# Patient Record
Sex: MALE | Race: WHITE | NOT HISPANIC OR LATINO | Employment: FULL TIME | URBAN - METROPOLITAN AREA
[De-identification: names, ages, dates, MRNs, and addresses within clinical notes are randomized per-mention and may not be internally consistent; named-entity substitution may affect disease eponyms.]

---

## 2018-05-22 ENCOUNTER — APPOINTMENT (OUTPATIENT)
Dept: URGENT CARE | Facility: MEDICAL CENTER | Age: 33
End: 2018-05-22
Payer: OTHER MISCELLANEOUS

## 2018-05-22 PROCEDURE — G0382 LEV 3 HOSP TYPE B ED VISIT: HCPCS

## 2018-05-22 PROCEDURE — 90715 TDAP VACCINE 7 YRS/> IM: CPT

## 2018-05-22 PROCEDURE — 99283 EMERGENCY DEPT VISIT LOW MDM: CPT

## 2018-05-24 ENCOUNTER — APPOINTMENT (OUTPATIENT)
Dept: URGENT CARE | Facility: MEDICAL CENTER | Age: 33
End: 2018-05-24
Payer: OTHER MISCELLANEOUS

## 2018-05-24 PROCEDURE — 99213 OFFICE O/P EST LOW 20 MIN: CPT | Performed by: PHYSICIAN ASSISTANT

## 2024-01-14 ENCOUNTER — HOSPITAL ENCOUNTER (EMERGENCY)
Facility: HOSPITAL | Age: 39
Discharge: HOME | End: 2024-01-14
Attending: EMERGENCY MEDICINE | Admitting: EMERGENCY MEDICINE
Payer: COMMERCIAL

## 2024-01-14 ENCOUNTER — APPOINTMENT (EMERGENCY)
Dept: CT IMAGING | Facility: HOSPITAL | Age: 39
End: 2024-01-14
Payer: COMMERCIAL

## 2024-01-14 ENCOUNTER — APPOINTMENT (EMERGENCY)
Dept: RADIOLOGY | Facility: HOSPITAL | Age: 39
End: 2024-01-14
Payer: COMMERCIAL

## 2024-01-14 VITALS
OXYGEN SATURATION: 97 % | DIASTOLIC BLOOD PRESSURE: 82 MMHG | SYSTOLIC BLOOD PRESSURE: 139 MMHG | TEMPERATURE: 98 F | BODY MASS INDEX: 26.71 KG/M2 | HEIGHT: 66 IN | WEIGHT: 166.23 LBS | HEART RATE: 88 BPM | RESPIRATION RATE: 18 BRPM

## 2024-01-14 DIAGNOSIS — S82.142A TIBIAL PLATEAU FRACTURE, LEFT: ICD-10-CM
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
## 2024-01-14 PROCEDURE — G1004 CDSM NDSC: HCPCS

## 2024-01-14 PROCEDURE — NC001 PR NO CHARGE: Performed by: EMERGENCY MEDICINE

## 2024-01-14 PROCEDURE — 71111 X-RAY EXAM RIBS/CHEST4/> VWS: CPT

## 2024-01-14 PROCEDURE — 73564 X-RAY EXAM KNEE 4 OR MORE: CPT

## 2024-01-14 PROCEDURE — 73700 CT LOWER EXTREMITY W/O DYE: CPT

## 2024-01-14 PROCEDURE — 99284 EMERGENCY DEPT VISIT MOD MDM: CPT

## 2024-01-14 RX ORDER — OXYCODONE HYDROCHLORIDE AND ACETAMINOPHEN 5; 325 MG/1; MG/1
1 TABLET ORAL EVERY 4 HOURS PRN
Qty: 12 TABLET | Refills: 0 | Status: SHIPPED | OUTPATIENT
Start: 2024-01-14 | End: 2024-01-16

## 2024-01-14 RX ORDER — OXYCODONE HYDROCHLORIDE AND ACETAMINOPHEN 5; 325 MG/1; MG/1
1 TABLET ORAL ONCE
Status: COMPLETED | OUTPATIENT
Start: 2024-01-14 | End: 2024-01-14

## 2024-01-14 RX ORDER — IBUPROFEN 400 MG/1
800 TABLET ORAL ONCE
Status: COMPLETED | OUTPATIENT
Start: 2024-01-14 | End: 2024-01-14

## 2024-01-14 RX ADMIN — IBUPROFEN 800 MG: 400 TABLET, FILM COATED ORAL at 18:40

## 2024-01-14 RX ADMIN — OXYCODONE HYDROCHLORIDE AND ACETAMINOPHEN 1 TABLET: 5; 325 TABLET ORAL at 21:40

## 2024-01-14 NOTE — Clinical Note
García Briscoe was seen and treated in our emergency department on 1/14/2024.        No work until cleared by Family Doctor/Orthopedics        Diagnosis:     García  .    He may return on this date:          If you have any questions or concerns, please don't hesitate to call.      She Posadas PA-C    ______________________________           _______________          _______________  Hospital Representative                              Date                                Time
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
## 2024-01-16 ENCOUNTER — TELEPHONE (OUTPATIENT)
Age: 39
End: 2024-01-16

## 2024-01-16 RX ORDER — OXYCODONE HYDROCHLORIDE AND ACETAMINOPHEN 5; 325 MG/1; MG/1
1 TABLET ORAL EVERY 4 HOURS PRN
Qty: 10 TABLET | Refills: 0 | Status: SHIPPED | OUTPATIENT
Start: 2024-01-16 | End: 2024-01-23

## 2024-01-16 NOTE — ED PROVIDER NOTES
1/16/2024  0958 - Patient unable to fill Percocet prescription at [facility] because they currently do not have power. Patient requesting we send the medication to [facility] in [place]. I attempted to call [facility] pharmacy to inform them we were discontinuing the Percocet prescription. I was unable to get in touch with the pharmacy staff but prescription was discontinued through [facility]. New prescription sent to [facility] in [place].      Stephania Dallas PA-C  01/16/24 1007

## 2024-01-16 NOTE — TELEPHONE ENCOUNTER
Caller: Patient     Doctor: Jennifer      Reason for call: Patient is a new patient with a scheduled appt on 1/18 with a Tibial plateau fracture he states he had a prescription sent over to [facility] but they have been closed and patient would like to know if prescription can be sent over to a different pharmacy   Please advise     Call back#: [phone]

## 2024-01-18 ENCOUNTER — TELEPHONE (OUTPATIENT)
Age: 39
End: 2024-01-18

## 2024-01-18 ENCOUNTER — LAB REQUISITION (OUTPATIENT)
Dept: LAB | Facility: HOSPITAL | Age: 39
End: 2024-01-18

## 2024-01-18 ENCOUNTER — APPOINTMENT (OUTPATIENT)
Dept: LAB | Facility: AMBULARY SURGERY CENTER | Age: 39
End: 2024-01-18
Payer: COMMERCIAL

## 2024-01-18 VITALS
DIASTOLIC BLOOD PRESSURE: 76 MMHG | BODY MASS INDEX: 26.71 KG/M2 | SYSTOLIC BLOOD PRESSURE: 113 MMHG | HEART RATE: 68 BPM | HEIGHT: 66 IN | WEIGHT: 166.22 LBS

## 2024-01-18 DIAGNOSIS — S82.142A DISPLACED BICONDYLAR FRACTURE OF LEFT TIBIA, INITIAL ENCOUNTER FOR CLOSED FRACTURE: ICD-10-CM

## 2024-01-18 DIAGNOSIS — S82.142A CLOSED BICONDYLAR FRACTURE OF LEFT TIBIAL PLATEAU: Primary | ICD-10-CM

## 2024-01-18 DIAGNOSIS — S82.142A CLOSED BICONDYLAR FRACTURE OF LEFT TIBIAL PLATEAU: ICD-10-CM

## 2024-01-18 LAB
ABO GROUP BLD: NORMAL
ANION GAP SERPL CALCULATED.3IONS-SCNC: 8 MMOL/L
BLD GP AB SCN SERPL QL: NEGATIVE
BUN SERPL-MCNC: 12 MG/DL (ref 5–25)
CALCIUM SERPL-MCNC: 9.8 MG/DL (ref 8.4–10.2)
CHLORIDE SERPL-SCNC: 103 MMOL/L (ref 96–108)
CO2 SERPL-SCNC: 28 MMOL/L (ref 21–32)
CREAT SERPL-MCNC: 0.9 MG/DL (ref 0.6–1.3)
ERYTHROCYTE [DISTWIDTH] IN BLOOD BY AUTOMATED COUNT: 12.1 % (ref 11.6–15.1)
GFR SERPL CREATININE-BSD FRML MDRD: 107 ML/MIN/1.73SQ M
GLUCOSE SERPL-MCNC: 96 MG/DL (ref 65–140)
HCT VFR BLD AUTO: 43.2 % (ref 36.5–49.3)
HGB BLD-MCNC: 14.4 G/DL (ref 12–17)
MCH RBC QN AUTO: 30.8 PG (ref 26.8–34.3)
MCHC RBC AUTO-ENTMCNC: 33.3 G/DL (ref 31.4–37.4)
MCV RBC AUTO: 92 FL (ref 82–98)
PLATELET # BLD AUTO: 175 THOUSANDS/UL (ref 149–390)
PMV BLD AUTO: 11.4 FL (ref 8.9–12.7)
POTASSIUM SERPL-SCNC: 4.3 MMOL/L (ref 3.5–5.3)
RBC # BLD AUTO: 4.68 MILLION/UL (ref 3.88–5.62)
RH BLD: NEGATIVE
SODIUM SERPL-SCNC: 139 MMOL/L (ref 135–147)
SPECIMEN EXPIRATION DATE: NORMAL
WBC # BLD AUTO: 5.42 THOUSAND/UL (ref 4.31–10.16)

## 2024-01-18 PROCEDURE — 86900 BLOOD TYPING SEROLOGIC ABO: CPT | Performed by: STUDENT IN AN ORGANIZED HEALTH CARE EDUCATION/TRAINING PROGRAM

## 2024-01-18 PROCEDURE — 80048 BASIC METABOLIC PNL TOTAL CA: CPT

## 2024-01-18 PROCEDURE — 36415 COLL VENOUS BLD VENIPUNCTURE: CPT

## 2024-01-18 PROCEDURE — 85027 COMPLETE CBC AUTOMATED: CPT

## 2024-01-18 PROCEDURE — 99204 OFFICE O/P NEW MOD 45 MIN: CPT | Performed by: STUDENT IN AN ORGANIZED HEALTH CARE EDUCATION/TRAINING PROGRAM

## 2024-01-18 PROCEDURE — 86850 RBC ANTIBODY SCREEN: CPT | Performed by: STUDENT IN AN ORGANIZED HEALTH CARE EDUCATION/TRAINING PROGRAM

## 2024-01-18 PROCEDURE — 86901 BLOOD TYPING SEROLOGIC RH(D): CPT | Performed by: STUDENT IN AN ORGANIZED HEALTH CARE EDUCATION/TRAINING PROGRAM

## 2024-01-18 RX ORDER — ASPIRIN 81 MG/1
81 TABLET, CHEWABLE ORAL 2 TIMES DAILY
Status: CANCELLED | OUTPATIENT
Start: 2024-01-18 | End: 2024-01-25

## 2024-01-18 RX ORDER — OXYCODONE HYDROCHLORIDE 5 MG/1
5 TABLET ORAL EVERY 4 HOURS PRN
Qty: 30 TABLET | Refills: 0 | Status: SHIPPED | OUTPATIENT
Start: 2024-01-18 | End: 2024-01-23

## 2024-01-18 RX ORDER — ASPIRIN 81 MG/1
81 TABLET, CHEWABLE ORAL 2 TIMES DAILY
Qty: 100 TABLET | Refills: 0 | Status: SHIPPED | OUTPATIENT
Start: 2024-01-18 | End: 2024-01-23

## 2024-01-18 RX ORDER — CHLORHEXIDINE GLUCONATE ORAL RINSE 1.2 MG/ML
15 SOLUTION DENTAL ONCE
Status: CANCELLED | OUTPATIENT
Start: 2024-01-18 | End: 2024-01-18

## 2024-01-18 RX ORDER — CHLORHEXIDINE GLUCONATE 4 G/100ML
SOLUTION TOPICAL DAILY PRN
Status: CANCELLED | OUTPATIENT
Start: 2024-01-18
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
## 2024-01-22 ENCOUNTER — ANESTHESIA EVENT (OUTPATIENT)
Dept: PERIOP | Facility: HOSPITAL | Age: 39
DRG: 494 | End: 2024-01-22
Payer: COMMERCIAL

## 2024-01-22 NOTE — PRE-PROCEDURE INSTRUCTIONS
Pre-Surgery Instructions:   Medication Instructions    aspirin 81 mg chewable tablet PER MD    oxyCODONE (Roxicodone) 5 immediate release tablet Uses PRN- OK to take day of surgery   Medication instructions for day surgery reviewed. Please use only a sip of water to take your instructed medications. Avoid all over the counter vitamins, supplements and NSAIDS for one week prior to surgery per anesthesia guidelines. Tylenol is ok to take as needed.     You will receive a call one business day prior to surgery with an arrival time and hospital directions. If your surgery is scheduled on a Monday, the hospital will be calling you on the Friday prior to your surgery. If you have not heard from anyone by 8pm, please call the hospital supervisor through the hospital  at [phone].     Do not eat or drink anything after midnight the night before your surgery, including candy, mints, lifesavers, or chewing gum. Do not drink alcohol 24hrs before your surgery. Try not to smoke at least 24hrs before your surgery.       Follow the pre surgery showering instructions as listed in the “My Surgical Experience Booklet” or otherwise provided by your surgeon's office. Do not use a blade to shave the surgical area 1 week before surgery. It is okay to use a clean electric clippers up to 24 hours before surgery. Do not apply any lotions, creams, including makeup, cologne, deodorant, or perfumes after showering on the day of your surgery. Do not use dry shampoo, hair spray, hair gel, or any type of hair products.     No contact lenses, eye make-up, or artificial eyelashes. Remove nail polish, including gel polish, and any artificial, gel, or acrylic nails if possible. Remove all jewelry including rings and body piercing jewelry.     Wear causal clothing that is easy to take on and off. Consider your type of surgery.    Keep any valuables, jewelry, piercings at home. Please bring any specially ordered equipment (sling, braces) if  indicated.    Arrange for a responsible person to drive you to and from the hospital on the day of your surgery. Visitor Guidelines discussed.     Call the surgeon's office with any new illnesses, exposures, or additional questions prior to surgery.    Please reference your “My Surgical Experience Booklet” for additional information to prepare for your upcoming surgery.

## 2024-01-22 NOTE — ANESTHESIA PREPROCEDURE EVALUATION
Procedure:  OPEN REDUCTION W/ INTERNAL FIXATION (ORIF) TIBIAL PLATEAU (Left: Knee)    Relevant Problems   No relevant active problems   Asthma  Former smoker     Physical Exam    Airway    Mallampati score: II  TM Distance: >3 FB  Neck ROM: full     Dental   No notable dental hx     Cardiovascular  Cardiovascular exam normal    Pulmonary  Pulmonary exam normal     Other Findings        Anesthesia Plan  ASA Score- 2     Anesthesia Type- general with ASA Monitors.         Additional Monitors:     Airway Plan: ETT.    Comment: + PNB.       Plan Factors-    Chart reviewed.   Existing labs reviewed. Patient summary reviewed.                  Induction- intravenous.    Postoperative Plan-     Informed Consent- Anesthetic plan and risks discussed with patient.  I personally reviewed this patient with the CRNA. Discussed and agreed on the Anesthesia Plan with the CRNA..

## 2024-01-23 ENCOUNTER — HOSPITAL ENCOUNTER (INPATIENT)
Facility: HOSPITAL | Age: 39
LOS: 1 days | Discharge: HOME | DRG: 494 | End: 2024-01-23
Attending: STUDENT IN AN ORGANIZED HEALTH CARE EDUCATION/TRAINING PROGRAM | Admitting: STUDENT IN AN ORGANIZED HEALTH CARE EDUCATION/TRAINING PROGRAM
Payer: COMMERCIAL

## 2024-01-23 ENCOUNTER — APPOINTMENT (OUTPATIENT)
Dept: RADIOLOGY | Facility: HOSPITAL | Age: 39
DRG: 494 | End: 2024-01-23
Payer: COMMERCIAL

## 2024-01-23 ENCOUNTER — ANESTHESIA (OUTPATIENT)
Dept: PERIOP | Facility: HOSPITAL | Age: 39
DRG: 494 | End: 2024-01-23
Payer: COMMERCIAL

## 2024-01-23 VITALS
BODY MASS INDEX: 26.68 KG/M2 | SYSTOLIC BLOOD PRESSURE: 122 MMHG | DIASTOLIC BLOOD PRESSURE: 61 MMHG | OXYGEN SATURATION: 98 % | WEIGHT: 166 LBS | HEIGHT: 66 IN | HEART RATE: 80 BPM | TEMPERATURE: 97.8 F | RESPIRATION RATE: 20 BRPM

## 2024-01-23 DIAGNOSIS — S82.142A CLOSED FRACTURE OF LEFT TIBIAL PLATEAU, INITIAL ENCOUNTER: Primary | ICD-10-CM

## 2024-01-23 PROCEDURE — C1713 ANCHOR/SCREW BN/BN,TIS/BN: HCPCS | Performed by: STUDENT IN AN ORGANIZED HEALTH CARE EDUCATION/TRAINING PROGRAM

## 2024-01-23 PROCEDURE — 73560 X-RAY EXAM OF KNEE 1 OR 2: CPT

## 2024-01-23 PROCEDURE — 27536 TREAT KNEE FRACTURE: CPT | Performed by: STUDENT IN AN ORGANIZED HEALTH CARE EDUCATION/TRAINING PROGRAM

## 2024-01-23 PROCEDURE — 0QSH04Z REPOSITION LEFT TIBIA WITH INTERNAL FIXATION DEVICE, OPEN APPROACH: ICD-10-PCS | Performed by: STUDENT IN AN ORGANIZED HEALTH CARE EDUCATION/TRAINING PROGRAM

## 2024-01-23 DEVICE — 3.5MM CORTEX SCREW SELF-TAPPING 38MM: Type: IMPLANTABLE DEVICE | Site: TIBIA | Status: FUNCTIONAL

## 2024-01-23 DEVICE — 3.5MM CORTEX SCREW SELF-TAPPING 34MM: Type: IMPLANTABLE DEVICE | Site: TIBIA | Status: FUNCTIONAL

## 2024-01-23 DEVICE — IMPLANTABLE DEVICE: Type: IMPLANTABLE DEVICE | Site: KNEE | Status: FUNCTIONAL

## 2024-01-23 DEVICE — 3.5MM VARIABLE ANGLE LOCKING SCREW/SLF-TPNG/STRDRV/75MM: Type: IMPLANTABLE DEVICE | Site: TIBIA | Status: FUNCTIONAL

## 2024-01-23 DEVICE — 3.5MM VA-LCP PROX TIBIA PLATE LARGE BEND/4H/87MM/LEFT
Type: IMPLANTABLE DEVICE | Site: TIBIA | Status: FUNCTIONAL
Brand: VA-LCP

## 2024-01-23 RX ORDER — MIDAZOLAM HYDROCHLORIDE 2 MG/2ML
INJECTION, SOLUTION INTRAMUSCULAR; INTRAVENOUS AS NEEDED
Status: DISCONTINUED | OUTPATIENT
Start: 2024-01-23 | End: 2024-01-23

## 2024-01-23 RX ORDER — ROCURONIUM BROMIDE 10 MG/ML
INJECTION, SOLUTION INTRAVENOUS AS NEEDED
Status: DISCONTINUED | OUTPATIENT
Start: 2024-01-23 | End: 2024-01-23

## 2024-01-23 RX ORDER — CHLORHEXIDINE GLUCONATE ORAL RINSE 1.2 MG/ML
15 SOLUTION DENTAL ONCE
Status: COMPLETED | OUTPATIENT
Start: 2024-01-23 | End: 2024-01-23

## 2024-01-23 RX ORDER — HYDROMORPHONE HCL/PF 1 MG/ML
0.5 SYRINGE (ML) INJECTION
Status: DISCONTINUED | OUTPATIENT
Start: 2024-01-23 | End: 2024-01-23 | Stop reason: HOSPADM

## 2024-01-23 RX ORDER — GLYCOPYRROLATE 0.2 MG/ML
INJECTION INTRAMUSCULAR; INTRAVENOUS AS NEEDED
Status: DISCONTINUED | OUTPATIENT
Start: 2024-01-23 | End: 2024-01-23

## 2024-01-23 RX ORDER — CEPHALEXIN 500 MG/1
500 CAPSULE ORAL EVERY 12 HOURS SCHEDULED
Qty: 10 CAPSULE | Refills: 0 | Status: SHIPPED | OUTPATIENT
Start: 2024-01-23 | End: 2024-01-28

## 2024-01-23 RX ORDER — CHLORHEXIDINE GLUCONATE 4 G/100ML
SOLUTION TOPICAL DAILY PRN
Status: DISCONTINUED | OUTPATIENT
Start: 2024-01-23 | End: 2024-01-23 | Stop reason: HOSPADM

## 2024-01-23 RX ORDER — CEFAZOLIN SODIUM 2 G/50ML
2000 SOLUTION INTRAVENOUS ONCE
Status: COMPLETED | OUTPATIENT
Start: 2024-01-23 | End: 2024-01-23

## 2024-01-23 RX ORDER — PROPOFOL 10 MG/ML
INJECTION, EMULSION INTRAVENOUS AS NEEDED
Status: DISCONTINUED | OUTPATIENT
Start: 2024-01-23 | End: 2024-01-23

## 2024-01-23 RX ORDER — ASPIRIN 325 MG
325 TABLET ORAL 2 TIMES DAILY
Qty: 84 TABLET | Refills: 0 | Status: SHIPPED | OUTPATIENT
Start: 2024-01-23 | End: 2024-03-05

## 2024-01-23 RX ORDER — ALBUTEROL SULFATE 90 UG/1
2 AEROSOL, METERED RESPIRATORY (INHALATION) EVERY 6 HOURS PRN
Status: DISCONTINUED | OUTPATIENT
Start: 2024-01-23 | End: 2024-01-23 | Stop reason: HOSPADM

## 2024-01-23 RX ORDER — MAGNESIUM HYDROXIDE 1200 MG/15ML
LIQUID ORAL AS NEEDED
Status: DISCONTINUED | OUTPATIENT
Start: 2024-01-23 | End: 2024-01-23 | Stop reason: HOSPADM

## 2024-01-23 RX ORDER — NEOSTIGMINE METHYLSULFATE 1 MG/ML
INJECTION INTRAVENOUS AS NEEDED
Status: DISCONTINUED | OUTPATIENT
Start: 2024-01-23 | End: 2024-01-23

## 2024-01-23 RX ORDER — VANCOMYCIN HYDROCHLORIDE 1 G/20ML
INJECTION, POWDER, LYOPHILIZED, FOR SOLUTION INTRAVENOUS AS NEEDED
Status: DISCONTINUED | OUTPATIENT
Start: 2024-01-23 | End: 2024-01-23 | Stop reason: HOSPADM

## 2024-01-23 RX ORDER — LIDOCAINE HYDROCHLORIDE 20 MG/ML
INJECTION, SOLUTION EPIDURAL; INFILTRATION; INTRACAUDAL; PERINEURAL AS NEEDED
Status: DISCONTINUED | OUTPATIENT
Start: 2024-01-23 | End: 2024-01-23

## 2024-01-23 RX ORDER — FENTANYL CITRATE 50 UG/ML
INJECTION, SOLUTION INTRAMUSCULAR; INTRAVENOUS AS NEEDED
Status: DISCONTINUED | OUTPATIENT
Start: 2024-01-23 | End: 2024-01-23

## 2024-01-23 RX ORDER — DEXAMETHASONE SODIUM PHOSPHATE 10 MG/ML
INJECTION, SOLUTION INTRAMUSCULAR; INTRAVENOUS AS NEEDED
Status: DISCONTINUED | OUTPATIENT
Start: 2024-01-23 | End: 2024-01-23

## 2024-01-23 RX ORDER — SODIUM CHLORIDE, SODIUM LACTATE, POTASSIUM CHLORIDE, CALCIUM CHLORIDE 600; 310; 30; 20 MG/100ML; MG/100ML; MG/100ML; MG/100ML
INJECTION, SOLUTION INTRAVENOUS CONTINUOUS PRN
Status: DISCONTINUED | OUTPATIENT
Start: 2024-01-23 | End: 2024-01-23

## 2024-01-23 RX ORDER — ONDANSETRON 2 MG/ML
INJECTION INTRAMUSCULAR; INTRAVENOUS AS NEEDED
Status: DISCONTINUED | OUTPATIENT
Start: 2024-01-23 | End: 2024-01-23

## 2024-01-23 RX ORDER — ONDANSETRON 2 MG/ML
4 INJECTION INTRAMUSCULAR; INTRAVENOUS ONCE AS NEEDED
Status: DISCONTINUED | OUTPATIENT
Start: 2024-01-23 | End: 2024-01-23 | Stop reason: HOSPADM

## 2024-01-23 RX ORDER — LIDOCAINE HYDROCHLORIDE 10 MG/ML
INJECTION, SOLUTION EPIDURAL; INFILTRATION; INTRACAUDAL; PERINEURAL AS NEEDED
Status: DISCONTINUED | OUTPATIENT
Start: 2024-01-23 | End: 2024-01-23

## 2024-01-23 RX ORDER — FENTANYL CITRATE/PF 50 MCG/ML
25 SYRINGE (ML) INJECTION
Status: DISCONTINUED | OUTPATIENT
Start: 2024-01-23 | End: 2024-01-23 | Stop reason: HOSPADM

## 2024-01-23 RX ORDER — OXYCODONE HYDROCHLORIDE 5 MG/1
5 TABLET ORAL EVERY 4 HOURS PRN
Status: DISCONTINUED | OUTPATIENT
Start: 2024-01-23 | End: 2024-01-23 | Stop reason: HOSPADM

## 2024-01-23 RX ORDER — OXYCODONE HYDROCHLORIDE 5 MG/1
5 TABLET ORAL EVERY 4 HOURS PRN
Qty: 30 TABLET | Refills: 0 | Status: SHIPPED | OUTPATIENT
Start: 2024-01-23 | End: 2024-02-02

## 2024-01-23 RX ORDER — ALBUTEROL SULFATE 90 UG/1
2 AEROSOL, METERED RESPIRATORY (INHALATION) EVERY 6 HOURS PRN
COMMUNITY

## 2024-01-23 RX ORDER — EPHEDRINE SULFATE 50 MG/ML
INJECTION INTRAVENOUS AS NEEDED
Status: DISCONTINUED | OUTPATIENT
Start: 2024-01-23 | End: 2024-01-23

## 2024-01-23 RX ORDER — HYDROMORPHONE HCL/PF 1 MG/ML
SYRINGE (ML) INJECTION AS NEEDED
Status: DISCONTINUED | OUTPATIENT
Start: 2024-01-23 | End: 2024-01-23

## 2024-01-23 RX ADMIN — NEOSTIGMINE METHYLSULFATE 3 MG: 0.5 INJECTION INTRAVENOUS at 09:25

## 2024-01-23 RX ADMIN — CEFAZOLIN SODIUM 2000 MG: 2 SOLUTION INTRAVENOUS at 07:35

## 2024-01-23 RX ADMIN — GLYCOPYRROLATE 0.6 MCG: 0.2 INJECTION INTRAMUSCULAR; INTRAVENOUS at 09:25

## 2024-01-23 RX ADMIN — FENTANYL CITRATE 100 MCG: 50 INJECTION INTRAMUSCULAR; INTRAVENOUS at 07:16

## 2024-01-23 RX ADMIN — MIDAZOLAM 2 MG: 1 INJECTION INTRAMUSCULAR; INTRAVENOUS at 07:16

## 2024-01-23 RX ADMIN — HYDROMORPHONE HYDROCHLORIDE 0.5 MG: 1 INJECTION, SOLUTION INTRAMUSCULAR; INTRAVENOUS; SUBCUTANEOUS at 07:47

## 2024-01-23 RX ADMIN — PROPOFOL 200 MG: 10 INJECTION, EMULSION INTRAVENOUS at 07:31

## 2024-01-23 RX ADMIN — ROCURONIUM BROMIDE 50 MG: 10 INJECTION, SOLUTION INTRAVENOUS at 07:31

## 2024-01-23 RX ADMIN — PHENYLEPHRINE HYDROCHLORIDE 100 MCG: 10 INJECTION INTRAVENOUS at 08:11

## 2024-01-23 RX ADMIN — EPHEDRINE SULFATE 10 MG: 50 INJECTION INTRAVENOUS at 08:39

## 2024-01-23 RX ADMIN — SODIUM CHLORIDE, SODIUM LACTATE, POTASSIUM CHLORIDE, AND CALCIUM CHLORIDE: .6; .31; .03; .02 INJECTION, SOLUTION INTRAVENOUS at 08:48

## 2024-01-23 RX ADMIN — LIDOCAINE HYDROCHLORIDE 80 MG: 20 INJECTION, SOLUTION EPIDURAL; INFILTRATION; INTRACAUDAL at 07:31

## 2024-01-23 RX ADMIN — DEXAMETHASONE SODIUM PHOSPHATE 10 MG: 10 INJECTION INTRAMUSCULAR; INTRAVENOUS at 07:31

## 2024-01-23 RX ADMIN — PHENYLEPHRINE HYDROCHLORIDE 200 MCG: 10 INJECTION INTRAVENOUS at 08:22

## 2024-01-23 RX ADMIN — EPHEDRINE SULFATE 10 MG: 50 INJECTION INTRAVENOUS at 08:29

## 2024-01-23 RX ADMIN — CHLORHEXIDINE GLUCONATE 15 ML: 1.2 SOLUTION ORAL at 06:48

## 2024-01-23 RX ADMIN — ONDANSETRON 4 MG: 2 INJECTION INTRAMUSCULAR; INTRAVENOUS at 09:02

## 2024-01-23 RX ADMIN — SODIUM CHLORIDE, SODIUM LACTATE, POTASSIUM CHLORIDE, AND CALCIUM CHLORIDE: .6; .31; .03; .02 INJECTION, SOLUTION INTRAVENOUS at 07:28

## 2024-01-23 NOTE — ANESTHESIA PROCEDURE NOTES
Peripheral Block    Patient location during procedure: holding area  Start time: 1/23/2024 7:20 AM  Reason for block: at surgeon's request and post-op pain management  Staffing  Performed by: Marcus Negrete MD  Authorized by: Marcus Negrete MD    Preanesthetic Checklist  Completed: patient identified, IV checked, site marked, risks and benefits discussed, surgical consent, monitors and equipment checked, pre-op evaluation and timeout performed  Peripheral Block  Patient position: supine  Prep: ChloraPrep  Patient monitoring: frequent blood pressure checks, continuous pulse oximetry and heart rate  Block type: Femoral  Laterality: left  Injection technique: single-shot  Procedures: ultrasound guided, Ultrasound guidance required for the procedure to increase accuracy and safety of medication placement and decrease risk of complications.  Ultrasound permanent image saved  Needle  Needle type: Stimuplex   Needle gauge: 20 G  Needle length: 4 in  Needle localization: anatomical landmarks and ultrasound guidance  Assessment  Injection assessment: incremental injection, frequent aspiration, injected with ease, negative aspiration, negative for heart rate change, no paresthesia on injection, no symptoms of intraneural/intravenous injection and needle tip visualized at all times  Paresthesia pain: none  Post-procedure:  site cleaned  patient tolerated the procedure well with no immediate complications

## 2024-01-23 NOTE — DISCHARGE INSTR - AVS FIRST PAGE
Discharge Instructions - Orthopedics  García Briscoe 38 y.o. male MRN: [id number]  Unit/Bed#: AN OR MAIN    Weight Bearing Status:                                           The patient will be nonweightbearing to the left lower extremity.    Patient is cleared for range of motion as tolerated to left lower extremity.     DVT prophylaxis:  Patient to take Aspirin 325mg twice per day for 6 weeks upon discharge     Antibiotics:  Patient to take Keflex 500mg twice per day for 5 days upon discharge     Pain:  Continue analgesics as directed    Dressing Instructions:   Please keep clean, dry and intact until follow up     Appt Instructions:   If you do not have your appointment, please call the clinic at [phone] to schedule with Dr. Rodriguez   Otherwise follow up as scheduled.    Contact the office sooner if you experience any increased numbness/tingling in the extremities.

## 2024-01-23 NOTE — INTERVAL H&P NOTE
H&P reviewed. After examining the patient I find no changes in the patients condition since the H&P had been written.    Vitals:    01/23/24 0650   BP: 113/72   Pulse: 69   Resp: 18   Temp: 97.9 °F (36.6 °C)   SpO2: 98%

## 2024-01-23 NOTE — ANESTHESIA POSTPROCEDURE EVALUATION
Post-Op Assessment Note    CV Status:  Stable    Pain management: adequate       Mental Status:  Arousable   Hydration Status:  Euvolemic   PONV Controlled:  Controlled   Airway Patency:  Patent     Post Op Vitals Reviewed: Yes    No anethesia notable event occurred.    Staff: CRNA               BP   117/57   Temp 98.2   Pulse 77   Resp 12   SpO2 97%
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
## 2024-01-23 NOTE — UTILIZATION REVIEW
"Initial Clinical Review    Elective inpatient  surgical procedure    Age/Sex: 38 y.o. male    Surgery Date: 1/23/24  Procedure: open reduction internal fixation of left bicondylar tibial plateau fracture   Anesthesia: General endotracheal   Operative Findings: Patient had a joint depression fracture of his lateral tibial plateau.  With the femoral distractor in place the area of joint impaction was well-visualized on the anterior lateral aspect of the tibial plateau.  This was localized using a K wire and a corticotomy was made.  Under direct visualization using both fluoroscopic imaging and direct visualization of his articular joint space the area of impaction was elevated to restore articular congruity anatomically at the level of the joint.  No meniscal injury was noted.  No capsular avulsion.  The bone void was then backfilled with Norian calcium phosphate.  The joint was then stabilized using a lateral tibial plateau plate with locking screws used proximally to stabilize any impaction fracture of the medial tibial condyle.  The joint was stable after fixation and articular congruity restored       Admission Orders: Date/Time/Statement:   Admission Orders (From admission, onward)       Ordered        01/23/24 1125  Inpatient Admission  Once                          Orders Placed This Encounter   Procedures    Inpatient Admission     Standing Status:   Standing     Number of Occurrences:   1     Order Specific Question:   Level of Care     Answer:   Med Surg [16]     Order Specific Question:   Estimated length of stay     Answer:   Inpatient Only Surgery     Vital Signs: /61   Pulse 80   Temp 97.8 °F (36.6 °C)   Resp 20   Ht 5' 6\" (1.676 m)   Wt 75.3 kg (166 lb)   SpO2 98%   BMI 26.79 kg/m²   01/23/24 1330 -- 80 20 122/61 -- 98 % -- -- None (Room air)   01/23/24 1315 -- 83 20 118/66 83 97 % -- -- None (Room air)   01/23/24 1200 -- 65 20 110/67 -- 98 % -- -- None (Room air)   01/23/24 1145 -- 63 18 " 112/65 85 97 % -- -- None (Room air)   01/23/24 1130 -- 68 20 122/60 -- -- -- -- --   01/23/24 1115 -- 67 20 120/65 -- -- -- -- --   01/23/24 1100 -- 66 20 136/79 -- -- -- -- --   01/23/24 1045 -- 67 20 135/80 -- -- -- -- --   01/23/24 1030 -- 67 20 119/56 -- 97 % -- -- None (Room air)   01/23/24 1015 -- 71 20 120/71 90 97 % -- -- None (Room air)   01/23/24 1004 97.8 °F (36.6 °C) 65 20 113/59 80 93 % -- -- None (Room air)   01/23/24 1000 98.6 °F (37 °C) 62 16 115/58 -- 98 % -- -- None (Room air)   01/23/24 0945 -- 66 14 111/54 78 96 % -- -- None (Room air)   01/23/24 0934 98.2 °F (36.8 °C) 80 16 117/57 82 98 % 28 2 L/min Nasal cannula     Pertinent Labs/Diagnostic Test Results:   XR knee 1 or 2 vw left   Final Result by Yousuf Bojorquez MD (01/23 1140)      Fluoroscopic guidance provided for procedure guidance.  Please refer to the separate procedure notes for additional details.            Workstation performed: [id number]           Results from last 7 days   Lab Units 01/18/24  1154   WBC Thousand/uL 5.42   HEMOGLOBIN g/dL 14.4   HEMATOCRIT % 43.2   PLATELETS Thousands/uL 175     Results from last 7 days   Lab Units 01/18/24  1154   SODIUM mmol/L 139   POTASSIUM mmol/L 4.3   CHLORIDE mmol/L 103   CO2 mmol/L 28   ANION GAP mmol/L 8   BUN mg/dL 12   CREATININE mg/dL 0.90   EGFR ml/min/1.73sq m 107   CALCIUM mg/dL 9.8     Results from last 7 days   Lab Units 01/18/24  1154   GLUCOSE RANDOM mg/dL 96       Diet: Regular  DVT Prophylaxis:  SCDs    Medications/Pain Control:   Scheduled Medications:  ceFAZolin (ANCEF) IVPB (premix in dextrose) 2,000 mg 50 mL  Dose: 2,000 mg  Freq: Once Route: IV  Start: 01/23/24 0645 End: 01/23/24 0735   chlorhexidine (PERIDEX) 0.12 % oral rinse 15 mL  Dose: 15 mL  Freq: Once Route: SWISH & SPIT  Start: 01/23/24 0645 End: 01/23/24 0648      Continuous IV Infusions: none      PRN Meds: not used.   albuterol, 2 puff, Inhalation, Q6H PRN  chlorhexidine, , Topical, Daily PRN  oxyCODONE, 5 mg,  Oral, Q4H PRN        Network Utilization Review Department  ATTENTION: Please call with any questions or concerns to [phone] and carefully listen to the prompts so that you are directed to the right person. All voicemails are confidential.   For Discharge needs, contact Care Management DC Support Team at [phone] opt. 2  Send all requests for admission clinical reviews, approved or denied determinations and any other requests to dedicated fax number below belonging to the campus where the patient is receiving treatment. List of dedicated fax numbers for the Facilities:  FACILITY NAME UR FAX NUMBER   ADMISSION DENIALS (Administrative/Medical Necessity) [phone]   DISCHARGE SUPPORT TEAM (NETWORK) [phone]   PARENT CHILD HEALTH (Maternity/NICU/Pediatrics) [phone]   [facility] [phone]   [facility] [phone]   [facility] [phone]   [facility] [phone]   [facility] [phone]   [facility] [phone]   [facility] [phone]   [facility] [phone]   [facility] [phone]   [facility] [phone]   [facility] [phone]

## 2024-01-23 NOTE — ANESTHESIA PROCEDURE NOTES
Peripheral Block    Patient location during procedure: holding area  Start time: 1/23/2024 7:25 AM  Reason for block: at surgeon's request and post-op pain management  Staffing  Performed by: Marcus Negrete MD  Authorized by: Marcus Negrete MD    Preanesthetic Checklist  Completed: patient identified, IV checked, site marked, risks and benefits discussed, surgical consent, monitors and equipment checked, pre-op evaluation and timeout performed  Peripheral Block  Patient position: right lateral  Prep: ChloraPrep  Patient monitoring: frequent blood pressure checks, continuous pulse oximetry and heart rate  Block type: Infragluteal Sciatic  Laterality: left  Injection technique: single-shot  Procedures: ultrasound guided, Ultrasound guidance required for the procedure to increase accuracy and safety of medication placement and decrease risk of complications.  Ultrasound permanent image saved  Needle  Needle type: Stimuplex   Needle gauge: 20 G  Needle length: 4 in  Needle localization: anatomical landmarks and ultrasound guidance  Assessment  Injection assessment: incremental injection, frequent aspiration, injected with ease, negative aspiration, negative for heart rate change, no paresthesia on injection, no symptoms of intraneural/intravenous injection and needle tip visualized at all times  Paresthesia pain: none  Post-procedure:  site cleaned  patient tolerated the procedure well with no immediate complications

## 2024-02-06 ENCOUNTER — OFFICE VISIT (OUTPATIENT)
Dept: OBGYN CLINIC | Facility: CLINIC | Age: 39
End: 2024-02-06

## 2024-02-06 ENCOUNTER — APPOINTMENT (OUTPATIENT)
Dept: RADIOLOGY | Facility: AMBULARY SURGERY CENTER | Age: 39
End: 2024-02-06
Payer: COMMERCIAL

## 2024-02-06 VITALS — HEIGHT: 66 IN | WEIGHT: 166 LBS | BODY MASS INDEX: 26.68 KG/M2

## 2024-02-06 DIAGNOSIS — S82.142A CLOSED BICONDYLAR FRACTURE OF LEFT TIBIAL PLATEAU: Primary | ICD-10-CM

## 2024-02-06 DIAGNOSIS — S82.142A CLOSED BICONDYLAR FRACTURE OF LEFT TIBIAL PLATEAU: ICD-10-CM

## 2024-02-06 PROCEDURE — 99024 POSTOP FOLLOW-UP VISIT: CPT | Performed by: STUDENT IN AN ORGANIZED HEALTH CARE EDUCATION/TRAINING PROGRAM

## 2024-02-06 PROCEDURE — 73560 X-RAY EXAM OF KNEE 1 OR 2: CPT
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
## 2024-02-12 ENCOUNTER — EVALUATION (OUTPATIENT)
Dept: PHYSICAL THERAPY | Facility: REHABILITATION | Age: 39
End: 2024-02-12
Payer: COMMERCIAL

## 2024-02-12 DIAGNOSIS — Z98.890 STATUS POST OPEN REDUCTION AND INTERNAL FIXATION (ORIF) OF FRACTURE: Primary | ICD-10-CM

## 2024-02-12 DIAGNOSIS — Z87.81 STATUS POST OPEN REDUCTION AND INTERNAL FIXATION (ORIF) OF FRACTURE: Primary | ICD-10-CM

## 2024-02-12 DIAGNOSIS — S82.142A CLOSED BICONDYLAR FRACTURE OF LEFT TIBIAL PLATEAU: ICD-10-CM

## 2024-02-12 PROCEDURE — 97110 THERAPEUTIC EXERCISES: CPT | Performed by: PHYSICAL THERAPIST

## 2024-02-12 PROCEDURE — 97161 PT EVAL LOW COMPLEX 20 MIN: CPT | Performed by: PHYSICAL THERAPIST

## 2024-02-12 PROCEDURE — 97535 SELF CARE MNGMENT TRAINING: CPT | Performed by: PHYSICAL THERAPIST
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
## 2024-02-14 ENCOUNTER — OFFICE VISIT (OUTPATIENT)
Dept: PHYSICAL THERAPY | Facility: REHABILITATION | Age: 39
End: 2024-02-14
Payer: COMMERCIAL

## 2024-02-14 DIAGNOSIS — S82.142A CLOSED BICONDYLAR FRACTURE OF LEFT TIBIAL PLATEAU: Primary | ICD-10-CM

## 2024-02-14 DIAGNOSIS — Z87.81 STATUS POST OPEN REDUCTION AND INTERNAL FIXATION (ORIF) OF FRACTURE: ICD-10-CM

## 2024-02-14 DIAGNOSIS — Z98.890 STATUS POST OPEN REDUCTION AND INTERNAL FIXATION (ORIF) OF FRACTURE: ICD-10-CM

## 2024-02-14 PROCEDURE — 97110 THERAPEUTIC EXERCISES: CPT | Performed by: PHYSICAL THERAPIST

## 2024-02-14 PROCEDURE — 97112 NEUROMUSCULAR REEDUCATION: CPT | Performed by: PHYSICAL THERAPIST

## 2024-02-14 NOTE — PROGRESS NOTES
"Daily Note     Today's date: 2024  Patient name: García Briscoe  : 1985  MRN: [id number]  Referring provider: Jimmy Rodriguez DO  Dx:   Encounter Diagnosis     ICD-10-CM    1. Closed bicondylar fracture of left tibial plateau  S82.142A       2. Status post open reduction and internal fixation (ORIF) of fracture  Z98.890     Z87.81           Start Time: 1030  Stop Time: 1110  Total time in clinic (min): 40 minutes    Subjective: Pt reports that he occasionally has anterior and posterior knee pain when performing HEP however does not remain consistent.       Objective: See treatment diary below      Assessment: Pt tolerated treatment well this visit with good challenge noted with current therapeutic exercise program. Slight increase in discomfort noted with SLR flexion and abduction however improved with rest. Pt educated on continued Wbing precautions. He would continue to benefit from skilled PT in order to progress ROM and strength as able to tolerate.       Plan: Continue per plan of care.      Precautions:  Patient Active Problem List   Diagnosis    Closed bicondylar fracture of left tibial plateau     NWB LLE: 2024 -3/19/2024    Patient's Goals:                FOTO nv            Eval/Re-eval                          Education             HEP/POC/protocol JAZMINE            Manuals             L knee PROM JAZMINE Full ROM noted           Patellar mobs nv                         Ther Ex             Quad sets 5\"x10 5\"  2x10           Glute sets 5\"x10 Bridges  5\" x10           HS/GS stretch 10\"x5 30\"x3           Heel slides --flex/abd 5\"x10 ea 5\"   2x10 ea           SAQ 5\" x10 5\"   2x10           Standing knee flexion nv            Standing hip abduction/extension L LE only  nv L LE only   2x10 ea           SLR-- flex, abduction  5\" 2x10 ea                                                               Neuro Re-ed             Ther Activity             Bike ROM 5' nv ROM 6'                         Gait " Training                                       Modalities

## 2024-02-19 ENCOUNTER — OFFICE VISIT (OUTPATIENT)
Dept: PHYSICAL THERAPY | Facility: REHABILITATION | Age: 39
End: 2024-02-19
Payer: COMMERCIAL

## 2024-02-19 DIAGNOSIS — S82.142A CLOSED BICONDYLAR FRACTURE OF LEFT TIBIAL PLATEAU: Primary | ICD-10-CM

## 2024-02-19 DIAGNOSIS — Z98.890 STATUS POST OPEN REDUCTION AND INTERNAL FIXATION (ORIF) OF FRACTURE: ICD-10-CM

## 2024-02-19 DIAGNOSIS — Z87.81 STATUS POST OPEN REDUCTION AND INTERNAL FIXATION (ORIF) OF FRACTURE: ICD-10-CM

## 2024-02-19 PROCEDURE — 97112 NEUROMUSCULAR REEDUCATION: CPT

## 2024-02-19 PROCEDURE — 97110 THERAPEUTIC EXERCISES: CPT

## 2024-02-19 NOTE — PROGRESS NOTES
"Daily Note     Today's date: 2024  Patient name: García Briscoe  : 1985  MRN: [id number]  Referring provider: Jimmy Rodriguez DO  Dx:   Encounter Diagnosis     ICD-10-CM    1. Closed bicondylar fracture of left tibial plateau  S82.142A       2. Status post open reduction and internal fixation (ORIF) of fracture  Z98.890     Z87.81           Start Time: 0945          Subjective: Patient denies pain symptoms at arrival and notes daily compliance with HEP. Patient reports intermittent N/T persists within medial hamstring however, abolishes with movement.       Objective: See treatment diary below      Assessment: Patient tolerated treatment session well. Patient able to complete all TE per post-op timeline without incident. Patient responded favorably to manual stretches without a reproduction of pain/discomfort. Patient challenged and fatigued by current program as LLE strength remains weak in this phase of recovery. Patient continues to be display occasional inconsistencies with WBing status even with cuing. Patient would benefit from continued stretching/strengthening per protocol to return to maximal level of function.       Plan: Continue per POC. Increase reps/resistance as tolerated.      Precautions:  Patient Active Problem List   Diagnosis    Closed bicondylar fracture of left tibial plateau     NWB LLE: 2024 -3/19/2024    Patient's Goals:               FOTO nv            Eval/Re-eval                          Education             HEP/POC/protocol JAZMINE            Manuals             L knee PROM JAZMINE Full ROM noted MS          Patellar mobs nv                         Ther Ex             Quad sets 5\"x10 5\"  2x10 5\"  2x10          Glute sets 5\"x10 Bridges  5\" x10 Bridges  5\" x10          HS/GS stretch 10\"x5 30\"x3 30\"x3 ea          Heel slides --flex/abd 5\"x10 ea 5\"   2x10 ea 5\"   2x10 ea          SAQ 5\" x10 5\"   2x10 5\"   2x10          Standing knee flexion nv            Standing hip " "abduction/extension L LE only  nv L LE only   2x10 ea L LE only   2x10 ea          SLR-- flex, abduction  5\" 2x10 ea 5\" 2x10 ea                                                              Neuro Re-ed             Ther Activity             Bike ROM 5' nv ROM 6'  ROM 6'                        Gait Training                                       Modalities                                                  "

## 2024-02-28 ENCOUNTER — OFFICE VISIT (OUTPATIENT)
Dept: PHYSICAL THERAPY | Facility: REHABILITATION | Age: 39
End: 2024-02-28
Payer: COMMERCIAL

## 2024-02-28 DIAGNOSIS — Z87.81 STATUS POST OPEN REDUCTION AND INTERNAL FIXATION (ORIF) OF FRACTURE: ICD-10-CM

## 2024-02-28 DIAGNOSIS — S82.142A CLOSED BICONDYLAR FRACTURE OF LEFT TIBIAL PLATEAU: Primary | ICD-10-CM

## 2024-02-28 DIAGNOSIS — Z98.890 STATUS POST OPEN REDUCTION AND INTERNAL FIXATION (ORIF) OF FRACTURE: ICD-10-CM

## 2024-02-28 PROCEDURE — 97112 NEUROMUSCULAR REEDUCATION: CPT | Performed by: PHYSICAL THERAPIST

## 2024-02-28 PROCEDURE — 97110 THERAPEUTIC EXERCISES: CPT | Performed by: PHYSICAL THERAPIST

## 2024-02-28 NOTE — PROGRESS NOTES
"Daily Note     Today's date: 2024  Patient name: García Briscoe  : 1985  MRN: [id number]  Referring provider: Jimmy Rodriguez DO  Dx:   Encounter Diagnosis     ICD-10-CM    1. Closed bicondylar fracture of left tibial plateau  S82.142A       2. Status post open reduction and internal fixation (ORIF) of fracture  Z98.890     Z87.81           Start Time: 1030  Stop Time: 1110  Total time in clinic (min): 40 minutes    Subjective: Pt reports compliance with HEP and denies any concerns. Reports he continues to experience L medial thigh pain that he believes is nerve related in nature.       Objective: See treatment diary below      Assessment: Pt tolerated treatment well this visit. Demonstrates good quadriceps contraction resulting in no quad lag with SLR. Pt also remains compliant with Wbing precautions in office/facility. Pt would benefit from NWBing resisted exercises next visit to improve proximal hip strength to prepare for Wbing and minimize gait compensations. Pt educated on continuing with current HEP with good understanding. Pt would continue to benefit from skilled PT in order to progress per protocol and optimize LE function.       Plan: Continue per plan of care.  Progress treament per protocol.      Precautions:  Patient Active Problem List   Diagnosis    Closed bicondylar fracture of left tibial plateau     NWB LLE: 2024 -3/19/2024    Patient's Goals:              FOTO nv            Eval/Re-eval                          Education             HEP/POC/protocol JAZMINE            Manuals             L knee PROM JAZMINE Full ROM noted MS D/c         Patellar mobs nv                         Ther Ex             Quad sets 5\"x10 5\"  2x10 5\"  2x10 HEP         Glute sets 5\"x10 Bridges  5\" x10 Bridges  5\" x10 Ball bridges (RPB)  5\"  2x10         HS/GS stretch 10\"x5 30\"x3 30\"x3 ea HEP         Heel slides --flex/abd 5\"x10 ea 5\"   2x10 ea 5\"   2x10 ea HEP         SAQ 5\" x10 5\"   2x10 5\"   2x10 " "5\"  3x10         Clamshells    GTB   5\" 2x10         Standing knee flexion nv            Standing hip abduction/extension L LE only  nv L LE only   2x10 ea L LE only   2x10 ea L LE only 1.5# 2x10ea   4-way kicks PTB         SLR-- flex, abduction  5\" 2x10 ea 5\" 2x10 ea 4-way SLR  3x10ea                                                             Neuro Re-ed             Ther Activity             Bike ROM 5' nv ROM 6'  ROM 6'  Full L2 6'                      Gait Training                                       Modalities                                                    "

## 2024-03-01 ENCOUNTER — OFFICE VISIT (OUTPATIENT)
Dept: PHYSICAL THERAPY | Facility: REHABILITATION | Age: 39
End: 2024-03-01
Payer: COMMERCIAL

## 2024-03-01 DIAGNOSIS — Z87.81 STATUS POST OPEN REDUCTION AND INTERNAL FIXATION (ORIF) OF FRACTURE: ICD-10-CM

## 2024-03-01 DIAGNOSIS — S82.142A CLOSED BICONDYLAR FRACTURE OF LEFT TIBIAL PLATEAU: Primary | ICD-10-CM

## 2024-03-01 DIAGNOSIS — Z98.890 STATUS POST OPEN REDUCTION AND INTERNAL FIXATION (ORIF) OF FRACTURE: ICD-10-CM

## 2024-03-01 PROCEDURE — 97110 THERAPEUTIC EXERCISES: CPT

## 2024-03-01 PROCEDURE — 97112 NEUROMUSCULAR REEDUCATION: CPT

## 2024-03-01 PROCEDURE — 97530 THERAPEUTIC ACTIVITIES: CPT

## 2024-03-01 NOTE — PROGRESS NOTES
"Daily Note     Today's date: 3/1/2024  Patient name: García Briscoe  : 1985  MRN: [id number]  Referring provider: Jimmy Rodriguez DO  Dx:   Encounter Diagnosis     ICD-10-CM    1. Closed bicondylar fracture of left tibial plateau  S82.142A       2. Status post open reduction and internal fixation (ORIF) of fracture  Z98.890     Z87.81                      Subjective: pt reports with c/o intermittent discomfort in anterior knee and continues with medial thigh nerve pain.      Objective: See treatment diary below      Assessment: Tolerated progressions and treatment well. Challenged with SLR series, but good quad control doing so. Appropriate level of fatigue post treatment. Patient demonstrated fatigue post treatment, exhibited good technique with therapeutic exercises, and would benefit from continued PT      Plan: Continue per plan of care.  Progress treatment as tolerated.       Precautions:  Patient Active Problem List   Diagnosis    Closed bicondylar fracture of left tibial plateau     NWB LLE: 2024 -3/19/2024    Patient's Goals:     2/12 2/14 2/19 2/28 3/1        FOTO nv            Eval/Re-eval                          Education             HEP/POC/protocol JAZMINE            Manuals             L knee PROM JAZMINE Full ROM noted MS D/c         Patellar mobs nv                         Ther Ex             Quad sets 5\"x10 5\"  2x10 5\"  2x10 HEP         Glute sets 5\"x10 Bridges  5\" x10 Bridges  5\" x10 Ball bridges (RPB)  5\"  2x10 Ball bridges (RPB)  5\"  2x10        HS/GS stretch 10\"x5 30\"x3 30\"x3 ea HEP         Heel slides --flex/abd 5\"x10 ea 5\"   2x10 ea 5\"   2x10 ea HEP         SAQ 5\" x10 5\"   2x10 5\"   2x10 5\"  3x10 5\"  3x10        Clamshells    GTB   5\" 2x10 GTB   5\" 2x10        Standing knee flexion nv            Standing hip abduction/extension L LE only  nv L LE only   2x10 ea L LE only   2x10 ea L LE only 1.5# 2x10ea   4-way kicks PTB x10          SLR-- flex, abduction  5\" 2x10 ea 5\" 2x10 ea 4-way " SLR  3x10ea 4-way SLR  3x10ea                                                            Neuro Re-ed             Ther Activity             Bike ROM 5' nv ROM 6'  ROM 6'  Full L2 6' Full L2 x9'                     Gait Training                                       Modalities

## 2024-03-06 ENCOUNTER — OFFICE VISIT (OUTPATIENT)
Dept: PHYSICAL THERAPY | Facility: REHABILITATION | Age: 39
End: 2024-03-06
Payer: COMMERCIAL

## 2024-03-06 DIAGNOSIS — S82.142A CLOSED BICONDYLAR FRACTURE OF LEFT TIBIAL PLATEAU: Primary | ICD-10-CM

## 2024-03-06 DIAGNOSIS — Z87.81 STATUS POST OPEN REDUCTION AND INTERNAL FIXATION (ORIF) OF FRACTURE: ICD-10-CM

## 2024-03-06 DIAGNOSIS — Z98.890 STATUS POST OPEN REDUCTION AND INTERNAL FIXATION (ORIF) OF FRACTURE: ICD-10-CM

## 2024-03-06 PROCEDURE — 97110 THERAPEUTIC EXERCISES: CPT

## 2024-03-06 PROCEDURE — 97112 NEUROMUSCULAR REEDUCATION: CPT

## 2024-03-06 NOTE — PROGRESS NOTES
"Daily Note     Today's date: 3/6/2024  Patient name: García Briscoe  : 1985  MRN: [id number]  Referring provider: Jimmy Rodriguez DO  Dx:   Encounter Diagnosis     ICD-10-CM    1. Closed bicondylar fracture of left tibial plateau  S82.142A       2. Status post open reduction and internal fixation (ORIF) of fracture  Z98.890     Z87.81                      Subjective: Patient reports no real pain in the affected extremity but continues to have a feeling that his leg is falling asleep in the anterior upper thigh.       Objective: See treatment diary below      Assessment: Tolerated treatment well. Patient would benefit from continued PT Good full ROM in knee, continues to follow precautions with bilateral crutches. VC's required for proper form during SLR x4 ways        Plan: Continue per plan of care.      Precautions: NWB   Patient Active Problem List   Diagnosis    Closed bicondylar fracture of left tibial plateau     NWB LLE: 2024 -3/19/2024    Patient's Goals:     2/12 2/14 2/19 2/28 3/1 3/6       FOTO nv            Eval/Re-eval                          Education             HEP/POC/protocol JAZMINE            Manuals             L knee PROM JAZMINE Full ROM noted MS D/c         Patellar mobs nv                         Ther Ex             Quad sets 5\"x10 5\"  2x10 5\"  2x10 HEP HHEP HEP       Glute sets 5\"x10 Bridges  5\" x10 Bridges  5\" x10 Ball bridges (RPB)  5\"  2x10 Ball bridges (RPB)  5\"  2x10 Ball bridges 5\"  2x10       HS/GS stretch 10\"x5 30\"x3 30\"x3 ea HEP         Heel slides --flex/abd 5\"x10 ea 5\"   2x10 ea 5\"   2x10 ea HEP         SAQ 5\" x10 5\"   2x10 5\"   2x10 5\"  3x10 5\"  3x10 5\"  3x10       Clamshells    GTB   5\" 2x10 GTB   5\" 2x10 GTB  5\"  2x10       Standing knee flexion nv     NV       Standing hip abduction/extension L LE only  nv L LE only   2x10 ea L LE only   2x10 ea L LE only 1.5# 2x10ea   4-way kicks PTB x10   1.5#  2x10       SLR-- flex, abduction  5\" 2x10 ea 5\" 2x10 ea 4-way SLR  3x10ea " 4-way SLR  3x10ea 4 way  SLR 3x10                                                           Neuro Re-ed             Ther Activity             Bike ROM 5' nv ROM 6'  ROM 6'  Full L2 6' Full L2 x9' Full   L2  7'                    Gait Training                                       Modalities

## 2024-03-08 ENCOUNTER — OFFICE VISIT (OUTPATIENT)
Dept: PHYSICAL THERAPY | Facility: REHABILITATION | Age: 39
End: 2024-03-08
Payer: COMMERCIAL

## 2024-03-08 DIAGNOSIS — Z87.81 STATUS POST OPEN REDUCTION AND INTERNAL FIXATION (ORIF) OF FRACTURE: ICD-10-CM

## 2024-03-08 DIAGNOSIS — S82.142A CLOSED BICONDYLAR FRACTURE OF LEFT TIBIAL PLATEAU: Primary | ICD-10-CM

## 2024-03-08 DIAGNOSIS — Z98.890 STATUS POST OPEN REDUCTION AND INTERNAL FIXATION (ORIF) OF FRACTURE: ICD-10-CM

## 2024-03-08 PROCEDURE — 97112 NEUROMUSCULAR REEDUCATION: CPT | Performed by: PHYSICAL THERAPIST

## 2024-03-08 PROCEDURE — 97110 THERAPEUTIC EXERCISES: CPT | Performed by: PHYSICAL THERAPIST

## 2024-03-08 NOTE — PROGRESS NOTES
"Daily Note     Today's date: 3/8/2024  Patient name: García Briscoe  : 1985  MRN: [id number]  Referring provider: Jimmy Rodriguez DO  Dx:   Encounter Diagnosis     ICD-10-CM    1. Closed bicondylar fracture of left tibial plateau  S82.142A       2. Status post open reduction and internal fixation (ORIF) of fracture  Z98.890     Z87.81           Start Time: 1200  Stop Time: 1240  Total time in clinic (min): 40 minutes    Subjective: Pt presents without any complaints at this time. He notes everyday feels a little bit better.      Objective: See treatment diary below      Assessment: Pt tolerated treatment well this visit. Continues to require VV cues to improve SLR series however continues to demonstrate good glute and quadriceps activation. Pt does not present with quad lag at this time and improvement in quadriceps atrophy. Increased muscular fatigue noted post-session however denied increase in pain. Pt would continue to benefit from skilled PT in order to progress as able to tolerate and per protocol to optimize LE function.       Plan: Continue per plan of care.  Progress treament per protocol.      Precautions: NWB   Patient Active Problem List   Diagnosis    Closed bicondylar fracture of left tibial plateau     NWB LLE: 2024 -3/19/2024    Patient's Goals:     2/12 2/14 2/19 2/28 3/1 3/6 3/8      FOTO nv            Eval/Re-eval                          Education             HEP/POC/protocol JAZMINE            Manuals             L knee PROM JAZMINE Full ROM noted MS D/c         Patellar mobs nv                         Ther Ex             Quad sets 5\"x10 5\"  2x10 5\"  2x10 HEP HHEP HEP       Glute sets 5\"x10 Bridges  5\" x10 Bridges  5\" x10 Ball bridges (RPB)  5\"  2x10 Ball bridges (RPB)  5\"  2x10 Ball bridges 5\"  2x10 Ball bridges 5\"  2x10      HS/GS stretch 10\"x5 30\"x3 30\"x3 ea HEP         Heel slides --flex/abd 5\"x10 ea 5\"   2x10 ea 5\"   2x10 ea HEP         SAQ 5\" x10 5\"   2x10 5\"   2x10 5\"  3x10 5\"  3x10 " "5\"  3x10 HEP      Clamshells    GTB   5\" 2x10 GTB   5\" 2x10 GTB  5\"  2x10 GTB  5\"  2x10      Standing knee flexion nv     NV       Standing hip abduction/extension L LE only  nv L LE only   2x10 ea L LE only   2x10 ea L LE only 1.5# 2x10ea   4-way kicks PTB x10   1.5#  2x10 np      SLR-- flex, abduction  5\" 2x10 ea 5\" 2x10 ea 4-way SLR  3x10ea 4-way SLR  3x10ea 4 way  SLR 3x10 4 way  SLR 3x10  1.5#      Leg ext/curls       11# 2x10                                             Neuro Re-ed             Ther Activity             Bike ROM 5' nv ROM 6'  ROM 6'  Full L2 6' Full L2 x9' Full   L2  7' L2 8'                   Gait Training                                       Modalities                                                          "

## 2024-03-13 ENCOUNTER — APPOINTMENT (OUTPATIENT)
Dept: PHYSICAL THERAPY | Facility: REHABILITATION | Age: 39
End: 2024-03-13
Payer: COMMERCIAL

## 2024-03-15 ENCOUNTER — OFFICE VISIT (OUTPATIENT)
Dept: PHYSICAL THERAPY | Facility: REHABILITATION | Age: 39
End: 2024-03-15
Payer: COMMERCIAL

## 2024-03-15 DIAGNOSIS — S82.142A CLOSED BICONDYLAR FRACTURE OF LEFT TIBIAL PLATEAU: Primary | ICD-10-CM

## 2024-03-15 DIAGNOSIS — Z98.890 STATUS POST OPEN REDUCTION AND INTERNAL FIXATION (ORIF) OF FRACTURE: ICD-10-CM

## 2024-03-15 DIAGNOSIS — Z87.81 STATUS POST OPEN REDUCTION AND INTERNAL FIXATION (ORIF) OF FRACTURE: ICD-10-CM

## 2024-03-15 PROCEDURE — 97112 NEUROMUSCULAR REEDUCATION: CPT | Performed by: PHYSICAL THERAPIST

## 2024-03-15 PROCEDURE — 97110 THERAPEUTIC EXERCISES: CPT | Performed by: PHYSICAL THERAPIST

## 2024-03-15 NOTE — PROGRESS NOTES
"Daily Note     Today's date: 3/15/2024  Patient name: García Briscoe  : 1985  MRN: [id number]  Referring provider: Jimmy Rodriguez DO  Dx:   Encounter Diagnosis     ICD-10-CM    1. Closed bicondylar fracture of left tibial plateau  S82.142A       2. Status post open reduction and internal fixation (ORIF) of fracture  Z98.890     Z87.81           Start Time: 1200  Stop Time: 1240  Total time in clinic (min): 40 minutes    Subjective: Pt reports that he overall is doing well. Denies pain or discomfort throughout knee and has been compliant with HEP. Pt does report pain of inferior patella/patellar tendon when performing knee flexion to extension.       Objective: See treatment diary below      Assessment: Pt tolerated treatment well this visit. Demonstrated improvement in tolerance to knee flexion to/from extension following patellar mobilizations and denied pain when performing LAQ. Good challenge with exercises as noted and documented below. Pt reports continued decreased sensation however has improved since post-op. Pt at this time would continue to benefit from skilled PT in order to progress LE strengthening and optimize LE function. Pt will have follow-up with referring MD on 3/19 to determine POC and if able to progress to Wbing next visit.       Plan: Continue per plan of care.      Precautions: NWB   Patient Active Problem List   Diagnosis    Closed bicondylar fracture of left tibial plateau     NWB LLE: 2024 -3/19/2024    Patient's Goals:     2/12 2/14 2/19 2/28 3/1 3/6 3/8 3/15     FOTO nv            Eval/Re-eval                          Education             HEP/POC/protocol JAZMINE            Manuals             L knee PROM JAZMINE Full ROM noted MS D/c         Patellar mobs nv       Grade II  JAZMINE                  Ther Ex             Quad sets 5\"x10 5\"  2x10 5\"  2x10 HEP HHEP HEP       Glute sets 5\"x10 Bridges  5\" x10 Bridges  5\" x10 Ball bridges (RPB)  5\"  2x10 Ball bridges (RPB)  5\"  2x10 Ball bridges " "5\"  2x10 Ball bridges 5\"  2x10 Ball bridges 5\"  2x10     HS/GS stretch 10\"x5 30\"x3 30\"x3 ea HEP         Heel slides --flex/abd 5\"x10 ea 5\"   2x10 ea 5\"   2x10 ea HEP         SAQ 5\" x10 5\"   2x10 5\"   2x10 5\"  3x10 5\"  3x10 5\"  3x10 HEP      Clamshells    GTB   5\" 2x10 GTB   5\" 2x10 GTB  5\"  2x10 GTB  5\"  2x10 GTB  5\"  2x10     Standing knee flexion nv     NV       Standing hip abduction/extension L LE only  nv L LE only   2x10 ea L LE only   2x10 ea L LE only 1.5# 2x10ea   4-way kicks PTB x10   1.5#  2x10 np 4-way kicks PTB x10      SLR-- flex, abduction  5\" 2x10 ea 5\" 2x10 ea 4-way SLR  3x10ea 4-way SLR  3x10ea 4 way  SLR 3x10 4 way  SLR 3x10  1.5# 4 way  SLR 3x10  1.5#     Leg ext/curls       11# 2x10 LAQ 1.5#  2x10                                            Neuro Re-ed             Ther Activity             Bike ROM 5' nv ROM 6'  ROM 6'  Full L2 6' Full L2 x9' Full   L2  7' L2 8' L2x10'                  Gait Training                                       Modalities                                                            "

## 2024-03-19 ENCOUNTER — OFFICE VISIT (OUTPATIENT)
Dept: OBGYN CLINIC | Facility: CLINIC | Age: 39
End: 2024-03-19

## 2024-03-19 ENCOUNTER — APPOINTMENT (OUTPATIENT)
Dept: RADIOLOGY | Facility: AMBULARY SURGERY CENTER | Age: 39
End: 2024-03-19
Attending: STUDENT IN AN ORGANIZED HEALTH CARE EDUCATION/TRAINING PROGRAM
Payer: COMMERCIAL

## 2024-03-19 VITALS — BODY MASS INDEX: 26.68 KG/M2 | WEIGHT: 166 LBS | HEIGHT: 66 IN

## 2024-03-19 DIAGNOSIS — S82.142A CLOSED BICONDYLAR FRACTURE OF LEFT TIBIAL PLATEAU: Primary | ICD-10-CM

## 2024-03-19 DIAGNOSIS — S82.142A CLOSED BICONDYLAR FRACTURE OF LEFT TIBIAL PLATEAU: ICD-10-CM

## 2024-03-19 PROCEDURE — 99024 POSTOP FOLLOW-UP VISIT: CPT | Performed by: STUDENT IN AN ORGANIZED HEALTH CARE EDUCATION/TRAINING PROGRAM

## 2024-03-19 PROCEDURE — 73560 X-RAY EXAM OF KNEE 1 OR 2: CPT
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
## 2024-03-19 NOTE — LETTER
March 19, 2024     Patient: García Briscoe  YOB: 1985  Date of Visit: 3/19/2024      To Whom it May Concern:    García Briscoe is under my professional care. García was seen in my office on 3/19/2024. García may return to work on 4/30/24 .    If you have any questions or concerns, please don't hesitate to call.         Sincerely,          Jimmy Rodriguez,         CC: No Recipients

## 2024-03-20 ENCOUNTER — APPOINTMENT (OUTPATIENT)
Dept: PHYSICAL THERAPY | Facility: REHABILITATION | Age: 39
End: 2024-03-20
Payer: COMMERCIAL

## 2024-03-22 ENCOUNTER — OFFICE VISIT (OUTPATIENT)
Dept: PHYSICAL THERAPY | Facility: REHABILITATION | Age: 39
End: 2024-03-22
Payer: COMMERCIAL

## 2024-03-22 DIAGNOSIS — S82.142A CLOSED BICONDYLAR FRACTURE OF LEFT TIBIAL PLATEAU: Primary | ICD-10-CM

## 2024-03-22 DIAGNOSIS — Z87.81 STATUS POST OPEN REDUCTION AND INTERNAL FIXATION (ORIF) OF FRACTURE: ICD-10-CM

## 2024-03-22 DIAGNOSIS — Z98.890 STATUS POST OPEN REDUCTION AND INTERNAL FIXATION (ORIF) OF FRACTURE: ICD-10-CM

## 2024-03-22 PROCEDURE — 97112 NEUROMUSCULAR REEDUCATION: CPT

## 2024-03-22 PROCEDURE — 97110 THERAPEUTIC EXERCISES: CPT

## 2024-03-22 NOTE — PROGRESS NOTES
"Daily Note     Today's date: 3/22/2024  Patient name: García Briscoe  : 1985  MRN: [id number]  Referring provider: Jimmy Rodriguez DO  Dx:   Encounter Diagnosis     ICD-10-CM    1. Closed bicondylar fracture of left tibial plateau  S82.142A       2. Status post open reduction and internal fixation (ORIF) of fracture  Z98.890     Z87.81           Start Time: 1030  Stop Time: 1115  Total time in clinic (min): 45 minutes    Subjective: Patient reports 0/10 pain.       Objective: See treatment diary below      Assessment: Tolerated treatment well.   Patient participated in skilled PT session focused on strengthening, stretching and ROM.  Patient able to complete exercise program with no issues or pain.  Patient guarded with putting full WB on LLE.  Patient continues to focus on increasing WB and strengthening exercises.  Patient would continue to benefit from skilled PT interventions to address strengthening, stretching, and ROM. Patient demonstrated fatigue post treatment and exhibited good technique with therapeutic exercises      Plan: Continue per plan of care.      Precautions: NWB   Patient Active Problem List   Diagnosis    Closed bicondylar fracture of left tibial plateau     NWB LLE: 2024 -3/19/2024    Patient's Goals:     2/12 2/14 2/19 2/28 3/1 3/6 3/8 3/15 3/22    FOTO nv            Eval/Re-eval                          Education             HEP/POC/protocol JAZMINE            Manuals             L knee PROM JAZMINE Full ROM noted MS D/c         Patellar mobs nv       Grade II  JAZMINE                  Ther Ex             Quad sets 5\"x10 5\"  2x10 5\"  2x10 HEP HHEP HEP       Glute sets 5\"x10 Bridges  5\" x10 Bridges  5\" x10 Ball bridges (RPB)  5\"  2x10 Ball bridges (RPB)  5\"  2x10 Ball bridges 5\"  2x10 Ball bridges 5\"  2x10 Ball bridges 5\"  2x10 Ball Bridges 5\" 2x10    HS/GS stretch 10\"x5 30\"x3 30\"x3 ea HEP         Heel slides --flex/abd 5\"x10 ea 5\"   2x10 ea 5\"   2x10 ea HEP         SAQ 5\" x10 5\"   2x10 5\"   2x10 " "5\"  3x10 5\"  3x10 5\"  3x10 HEP      Clamshells    GTB   5\" 2x10 GTB   5\" 2x10 GTB  5\"  2x10 GTB  5\"  2x10 GTB  5\"  2x10 S/L GTB 5\" 2x10    Standing knee flexion nv     NV       Standing hip abduction/extension L LE only  nv L LE only   2x10 ea L LE only   2x10 ea L LE only 1.5# 2x10ea   4-way kicks PTB x10   1.5#  2x10 np 4-way kicks PTB x10  4 way kicks PTB 10x ea    SLR-- flex, abduction  5\" 2x10 ea 5\" 2x10 ea 4-way SLR  3x10ea 4-way SLR  3x10ea 4 way  SLR 3x10 4 way  SLR 3x10  1.5# 4 way  SLR 3x10  1.5# 4 way SLR 3x10 2#    Leg ext/curls       11# 2x10 LAQ 1.5#  2x10 LAQ 2# 5\" 2x10                                           Neuro Re-ed             Ther Activity             Bike ROM 5' nv ROM 6'  ROM 6'  Full L2 6' Full L2 x9' Full   L2  7' L2 8' L2x10' L2 x10 min                 Gait Training                                       Modalities                                                              "

## 2024-03-29 ENCOUNTER — OFFICE VISIT (OUTPATIENT)
Dept: PHYSICAL THERAPY | Facility: REHABILITATION | Age: 39
End: 2024-03-29
Payer: COMMERCIAL

## 2024-03-29 DIAGNOSIS — Z98.890 STATUS POST OPEN REDUCTION AND INTERNAL FIXATION (ORIF) OF FRACTURE: ICD-10-CM

## 2024-03-29 DIAGNOSIS — S82.142A CLOSED BICONDYLAR FRACTURE OF LEFT TIBIAL PLATEAU: Primary | ICD-10-CM

## 2024-03-29 DIAGNOSIS — Z87.81 STATUS POST OPEN REDUCTION AND INTERNAL FIXATION (ORIF) OF FRACTURE: ICD-10-CM

## 2024-03-29 PROCEDURE — 97112 NEUROMUSCULAR REEDUCATION: CPT

## 2024-03-29 PROCEDURE — 97110 THERAPEUTIC EXERCISES: CPT

## 2024-03-29 NOTE — PROGRESS NOTES
"Daily Note     Today's date: 3/29/2024  Patient name: García Briscoe  : 1985  MRN: [id number]  Referring provider: Jimmy Rodriguez DO  Dx:   Encounter Diagnosis     ICD-10-CM    1. Closed bicondylar fracture of left tibial plateau  S82.142A       2. Status post open reduction and internal fixation (ORIF) of fracture  Z98.890     Z87.81             Start Time: 1203  Stop Time: 1250  Total time in clinic (min): 47 minutes    Subjective: Patient reports no new complaints or major changes since last session. Patient notes no presence of L LE pain today.      Objective: See treatment diary below      Assessment: Tolerated treatment well. Continued to focus on L LE strengthening and WB exercises to address remaining deficits in order to maximize function. Patient did not experience any onset of symptoms throughout today's session. Patient displayed proper form and had a good recall/understanding of all exercises in his program. Muscle fatigue present at the conclusion of session. Patient demonstrated fatigue post treatment and exhibited good technique with therapeutic exercises      Plan: Continue per plan of care.      Precautions: NWB   Patient Active Problem List   Diagnosis    Closed bicondylar fracture of left tibial plateau     NWB LLE: 2024 -3/19/2024    Patient's Goals:     2/12 2/14 2/19 2/28 3/1 3/6 3/8 3/15 3/22 3/29   FOTO nv            Eval/Re-eval                          Education             HEP/POC/protocol JAZMINE            Manuals             L knee PROM JAZMINE Full ROM noted MS D/c         Patellar mobs nv       Grade II  JAZMINE                  Ther Ex             Quad sets 5\"x10 5\"  2x10 5\"  2x10 HEP HHEP HEP       Glute sets 5\"x10 Bridges  5\" x10 Bridges  5\" x10 Ball bridges (RPB)  5\"  2x10 Ball bridges (RPB)  5\"  2x10 Ball bridges 5\"  2x10 Ball bridges 5\"  2x10 Ball bridges 5\"  2x10 Ball Bridges 5\" 2x10 Ball Bridges 5\" 2x10   HS/GS stretch 10\"x5 30\"x3 30\"x3 ea HEP         Heel slides --flex/abd " "5\"x10 ea 5\"   2x10 ea 5\"   2x10 ea HEP         SAQ 5\" x10 5\"   2x10 5\"   2x10 5\"  3x10 5\"  3x10 5\"  3x10 HEP      Clamshells    GTB   5\" 2x10 GTB   5\" 2x10 GTB  5\"  2x10 GTB  5\"  2x10 GTB  5\"  2x10 S/L GTB 5\" 2x10 S/L GTB 5\" 2x10   Standing knee flexion nv     NV       Standing hip abduction/extension L LE only  nv L LE only   2x10 ea L LE only   2x10 ea L LE only 1.5# 2x10ea   4-way kicks PTB x10   1.5#  2x10 np 4-way kicks PTB x10  4 way kicks PTB 10x ea 4 way kicks PTB 10x ea   SLR-- flex, abduction  5\" 2x10 ea 5\" 2x10 ea 4-way SLR  3x10ea 4-way SLR  3x10ea 4 way  SLR 3x10 4 way  SLR 3x10  1.5# 4 way  SLR 3x10  1.5# 4 way SLR 3x10 2# 4 way SLR 3x10 2#   Leg ext/curls       11# 2x10 LAQ 1.5#  2x10 LAQ 2# 5\" 2x10 LAQ 2# 5\" 2x10                                          Neuro Re-ed             Ther Activity             Bike ROM 5' nv ROM 6'  ROM 6'  Full L2 6' Full L2 x9' Full   L2  7' L2 8' L2x10' L2 x10 min L2 x10 min                Gait Training                                       Modalities                                                              "

## 2024-04-03 ENCOUNTER — OFFICE VISIT (OUTPATIENT)
Dept: PHYSICAL THERAPY | Facility: REHABILITATION | Age: 39
End: 2024-04-03
Payer: COMMERCIAL

## 2024-04-03 DIAGNOSIS — S82.142A CLOSED BICONDYLAR FRACTURE OF LEFT TIBIAL PLATEAU: Primary | ICD-10-CM

## 2024-04-03 DIAGNOSIS — Z87.81 STATUS POST OPEN REDUCTION AND INTERNAL FIXATION (ORIF) OF FRACTURE: ICD-10-CM

## 2024-04-03 DIAGNOSIS — Z98.890 STATUS POST OPEN REDUCTION AND INTERNAL FIXATION (ORIF) OF FRACTURE: ICD-10-CM

## 2024-04-03 PROCEDURE — 97112 NEUROMUSCULAR REEDUCATION: CPT

## 2024-04-03 PROCEDURE — 97110 THERAPEUTIC EXERCISES: CPT

## 2024-04-03 NOTE — PROGRESS NOTES
"Daily Note     Today's date: 4/3/2024  Patient name: García Briscoe  : 1985  MRN: [id number]  Referring provider: Jimmy Rodriguez DO  Dx:   Encounter Diagnosis     ICD-10-CM    1. Closed bicondylar fracture of left tibial plateau  S82.142A       2. Status post open reduction and internal fixation (ORIF) of fracture  Z98.890     Z87.81                      Subjective:   Patient reports that his L leg is feeling ok this morning.  Patient reports that prolonged standing (several hours) is bothersome but otherwise no issues.        Objective: See treatment diary below      Assessment: Patient tolerated treatment well. Patient performed ex and received manual therapy as noted.  Patient performed ex without issue.  Patient would benefit from continued PT intervention with focus on strengthening and improving weight bearing endurance.     Reviewed plan of care with BETTY Tovar DPT with updated ex added to plan for next visit.     Plan: Continue per plan of care.      Precautions: NWB   Patient Active Problem List   Diagnosis    Closed bicondylar fracture of left tibial plateau     NWB LLE: 2024 -3/19/2024    Patient's Goals:     4/3  2/19 2/28 3/1 3/6 3/8 3/15 3/22 3/29   FOTO             Eval/Re-eval                          Education             HEP/POC/protocol             Manuals             L knee PROM   MS D/c         Patellar mobs        Grade II  JAZMINE                  Ther Ex             Quad sets   5\"  2x10 HEP HHEP HEP       Glute sets Ball bridge  Green pball  5\"  2x10  Bridges  5\" x10 Ball bridges (RPB)  5\"  2x10 Ball bridges (RPB)  5\"  2x10 Ball bridges 5\"  2x10 Ball bridges 5\"  2x10 Ball bridges 5\"  2x10 Ball Bridges 5\" 2x10 Ball Bridges 5\" 2x10   HS/GS stretch   30\"x3 ea HEP         Heel slides --flex/abd   5\"   2x10 ea HEP         SAQ   5\"   2x10 5\"  3x10 5\"  3x10 5\"  3x10 HEP      Clamshells S/L  BTB  5\"  2x10   GTB   5\" 2x10 GTB   5\" 2x10 GTB  5\"  2x10 GTB  5\"  2x10 GTB  5\"  2x10 S/L GTB 5\" 2x10 S/L " "GTB 5\" 2x10   Standing knee flexion      NV       Standing hip abduction/extension 4 way kicks  GTB x20 ea B  L LE only   2x10 ea L LE only 1.5# 2x10ea   4-way kicks PTB x10   1.5#  2x10 np 4-way kicks PTB x10  4 way kicks PTB 10x ea 4 way kicks PTB 10x ea   SLR-- flex, abduction 4way SLR  2.5#  2x10    5\" 2x10 ea 4-way SLR  3x10ea 4-way SLR  3x10ea 4 way  SLR 3x10 4 way  SLR 3x10  1.5# 4 way  SLR 3x10  1.5# 4 way SLR 3x10 2# 4 way SLR 3x10 2#   Leg ext/curls LAQ  2.5#  2x10      11# 2x10 LAQ 1.5#  2x10 LAQ 2# 5\" 2x10 LAQ 2# 5\" 2x10   Leg press nv *           SLS  *           Mini squats  *           Neuro Re-ed             Ther Activity             Bike L2  x10 min  ROM 6'  Full L2 6' Full L2 x9' Full   L2  7' L2 8' L2x10' L2 x10 min L2 x10 min                Gait Training                                       Modalities                                                                "

## 2024-04-05 ENCOUNTER — APPOINTMENT (OUTPATIENT)
Dept: PHYSICAL THERAPY | Facility: REHABILITATION | Age: 39
End: 2024-04-05
Payer: COMMERCIAL

## 2024-04-10 ENCOUNTER — APPOINTMENT (OUTPATIENT)
Dept: PHYSICAL THERAPY | Facility: REHABILITATION | Age: 39
End: 2024-04-10
Payer: COMMERCIAL

## 2024-04-12 ENCOUNTER — APPOINTMENT (OUTPATIENT)
Dept: PHYSICAL THERAPY | Facility: REHABILITATION | Age: 39
End: 2024-04-12
Payer: COMMERCIAL

## 2024-05-20 ENCOUNTER — APPOINTMENT (OUTPATIENT)
Dept: RADIOLOGY | Facility: AMBULARY SURGERY CENTER | Age: 39
End: 2024-05-20
Attending: STUDENT IN AN ORGANIZED HEALTH CARE EDUCATION/TRAINING PROGRAM
Payer: COMMERCIAL

## 2024-05-20 ENCOUNTER — OFFICE VISIT (OUTPATIENT)
Dept: OBGYN CLINIC | Facility: CLINIC | Age: 39
End: 2024-05-20
Payer: COMMERCIAL

## 2024-05-20 DIAGNOSIS — S82.142A CLOSED BICONDYLAR FRACTURE OF LEFT TIBIAL PLATEAU: Primary | ICD-10-CM

## 2024-05-20 DIAGNOSIS — S82.142A CLOSED BICONDYLAR FRACTURE OF LEFT TIBIAL PLATEAU: ICD-10-CM

## 2024-05-20 PROCEDURE — 99213 OFFICE O/P EST LOW 20 MIN: CPT | Performed by: STUDENT IN AN ORGANIZED HEALTH CARE EDUCATION/TRAINING PROGRAM

## 2024-05-20 PROCEDURE — 73560 X-RAY EXAM OF KNEE 1 OR 2: CPT
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
## 2024-08-15 ENCOUNTER — OFFICE VISIT (OUTPATIENT)
Dept: OBGYN CLINIC | Facility: CLINIC | Age: 39
End: 2024-08-15
Payer: COMMERCIAL

## 2024-08-15 ENCOUNTER — APPOINTMENT (OUTPATIENT)
Dept: RADIOLOGY | Facility: AMBULARY SURGERY CENTER | Age: 39
End: 2024-08-15
Attending: STUDENT IN AN ORGANIZED HEALTH CARE EDUCATION/TRAINING PROGRAM
Payer: COMMERCIAL

## 2024-08-15 VITALS — BODY MASS INDEX: 26.68 KG/M2 | WEIGHT: 166 LBS | HEIGHT: 66 IN

## 2024-08-15 DIAGNOSIS — Z98.890 S/P ORIF (OPEN REDUCTION INTERNAL FIXATION) FRACTURE: ICD-10-CM

## 2024-08-15 DIAGNOSIS — S82.142A CLOSED BICONDYLAR FRACTURE OF LEFT TIBIAL PLATEAU: Primary | ICD-10-CM

## 2024-08-15 DIAGNOSIS — S82.142A CLOSED BICONDYLAR FRACTURE OF LEFT TIBIAL PLATEAU: ICD-10-CM

## 2024-08-15 DIAGNOSIS — Z87.81 S/P ORIF (OPEN REDUCTION INTERNAL FIXATION) FRACTURE: ICD-10-CM

## 2024-08-15 PROCEDURE — 99213 OFFICE O/P EST LOW 20 MIN: CPT | Performed by: STUDENT IN AN ORGANIZED HEALTH CARE EDUCATION/TRAINING PROGRAM

## 2024-08-15 PROCEDURE — 73560 X-RAY EXAM OF KNEE 1 OR 2: CPT
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
## 2024-10-17 ENCOUNTER — HOSPITAL ENCOUNTER (EMERGENCY)
Facility: HOSPITAL | Age: 39
Discharge: HOME | End: 2024-10-17
Attending: EMERGENCY MEDICINE
Payer: COMMERCIAL

## 2024-10-17 ENCOUNTER — APPOINTMENT (EMERGENCY)
Dept: RADIOLOGY | Facility: HOSPITAL | Age: 39
End: 2024-10-17
Payer: COMMERCIAL

## 2024-10-17 VITALS
SYSTOLIC BLOOD PRESSURE: 141 MMHG | OXYGEN SATURATION: 98 % | RESPIRATION RATE: 20 BRPM | HEART RATE: 97 BPM | DIASTOLIC BLOOD PRESSURE: 83 MMHG | TEMPERATURE: 98.5 F

## 2024-10-17 DIAGNOSIS — S80.02XA CONTUSION OF LEFT KNEE, INITIAL ENCOUNTER: Primary | ICD-10-CM

## 2024-10-17 PROCEDURE — 96372 THER/PROPH/DIAG INJ SC/IM: CPT

## 2024-10-17 PROCEDURE — 99283 EMERGENCY DEPT VISIT LOW MDM: CPT

## 2024-10-17 PROCEDURE — 99284 EMERGENCY DEPT VISIT MOD MDM: CPT | Performed by: EMERGENCY MEDICINE

## 2024-10-17 PROCEDURE — 73564 X-RAY EXAM KNEE 4 OR MORE: CPT

## 2024-10-17 RX ORDER — KETOROLAC TROMETHAMINE 30 MG/ML
15 INJECTION, SOLUTION INTRAMUSCULAR; INTRAVENOUS ONCE
Status: COMPLETED | OUTPATIENT
Start: 2024-10-17 | End: 2024-10-17

## 2024-10-17 RX ORDER — ACETAMINOPHEN 325 MG/1
975 TABLET ORAL ONCE
Status: COMPLETED | OUTPATIENT
Start: 2024-10-17 | End: 2024-10-17

## 2024-10-17 RX ADMIN — ACETAMINOPHEN 975 MG: 325 TABLET ORAL at 22:49

## 2024-10-17 RX ADMIN — KETOROLAC TROMETHAMINE 15 MG: 30 INJECTION, SOLUTION INTRAMUSCULAR at 22:49
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
## 2025-01-30 ENCOUNTER — OFFICE VISIT (OUTPATIENT)
Dept: URGENT CARE | Age: 40
End: 2025-01-30
Payer: COMMERCIAL

## 2025-01-30 VITALS
WEIGHT: 166 LBS | SYSTOLIC BLOOD PRESSURE: 126 MMHG | DIASTOLIC BLOOD PRESSURE: 84 MMHG | HEART RATE: 95 BPM | RESPIRATION RATE: 20 BRPM | TEMPERATURE: 102.6 F | OXYGEN SATURATION: 97 % | BODY MASS INDEX: 26.79 KG/M2

## 2025-01-30 DIAGNOSIS — J06.9 ACUTE URI: Primary | ICD-10-CM

## 2025-01-30 PROCEDURE — S9083 URGENT CARE CENTER GLOBAL: HCPCS

## 2025-01-30 PROCEDURE — G0382 LEV 3 HOSP TYPE B ED VISIT: HCPCS

## 2025-01-30 RX ORDER — IBUPROFEN 600 MG/1
600 TABLET, FILM COATED ORAL ONCE
Status: COMPLETED | OUTPATIENT
Start: 2025-01-30 | End: 2025-01-30

## 2025-01-30 RX ORDER — FLUTICASONE PROPIONATE 50 MCG
1 SPRAY, SUSPENSION (ML) NASAL DAILY
Qty: 11.1 ML | Refills: 0 | Status: SHIPPED | OUTPATIENT
Start: 2025-01-30

## 2025-01-30 RX ORDER — ALBUTEROL SULFATE 90 UG/1
2 INHALANT RESPIRATORY (INHALATION) EVERY 6 HOURS PRN
Qty: 8.5 G | Refills: 0 | Status: SHIPPED | OUTPATIENT
Start: 2025-01-30

## 2025-01-30 RX ADMIN — IBUPROFEN 600 MG: 600 TABLET, FILM COATED ORAL at 09:04

## 2025-01-30 NOTE — PROGRESS NOTES
[facility]        NAME: García Briscoe is a 39 y.o. male  : 1985    MRN: [id number]  DATE: 2025  TIME: 9:10 AM    Assessment and Plan   Acute URI [J06.9]  1. Acute URI  fluticasone (FLONASE) 50 mcg/act nasal spray    albuterol (ProAir HFA) 90 mcg/act inhaler    ibuprofen (MOTRIN) tablet 600 mg        URI symptoms for 2 days. Hx of asthma. Feels tight at times. BS clear. Motrin given during visit.     Patient Instructions       Follow up with PCP in 3-5 days.  Proceed to  ER if symptoms worsen.    If tests have been performed at [facility], our office will contact you with results if changes need to be made to the care plan discussed with you at the visit.  [facility]    Chief Complaint     Chief Complaint   Patient presents with    Earache    Cold Like Symptoms     Started two days ago with bilateral ear pain/mild fullness, nasal congestion, chills, body aches, fever of 101.2 this am. Covid test negative.  Taking Tylenol and Nyquil, none today.  Neg exposure to influenza.           History of Present Illness       URI symptoms for 2 days. Hx of asthma. Feels tight at times. BS clear. Motrin given during visit.         Review of Systems   Review of Systems   Constitutional:  Positive for chills and fever.   HENT:  Positive for congestion, postnasal drip and rhinorrhea.    Respiratory:  Positive for cough and chest tightness.    All other systems reviewed and are negative.        Current Medications       Current Outpatient Medications:     albuterol (ProAir HFA) 90 mcg/act inhaler, Inhale 2 puffs every 6 (six) hours as needed for wheezing, Disp: 8.5 g, Rfl: 0    fluticasone (FLONASE) 50 mcg/act nasal spray, 1 spray into each nostril daily, Disp: 11.1 mL, Rfl: 0    aspirin 325 mg tablet, Take 1 tablet (325 mg total) by mouth 2 (two) times a day, Disp: 84 tablet, Rfl: 0  No current facility-administered medications for this visit.    Current  Allergies     Allergies as of 01/30/2025    (No Known Allergies)            The following portions of the patient's history were reviewed and updated as appropriate: allergies, current medications, past family history, past medical history, past social history, past surgical history and problem list.     Past Medical History:   Diagnosis Date    Asthma        Past Surgical History:   Procedure Laterality Date    COLONOSCOPY      DENTAL EXAMINATION UNDER ANESTHESIA      ORIF TIBIAL PLATEAU Left 1/23/2024    Procedure: OPEN REDUCTION W/ INTERNAL FIXATION (ORIF) TIBIAL PLATEAU;  Surgeon: Jimmy Rodriguez DO;  [facility];  Service: Orthopedics       No family history on file.      Medications have been verified.        Objective   /84   Pulse 95   Temp (!) 102.6 °F (39.2 °C)   Resp 20   Wt 75.3 kg (166 lb)   SpO2 97%   BMI 26.79 kg/m²   No LMP for male patient.       Physical Exam     Physical Exam  Vitals reviewed.   Constitutional:       Appearance: Normal appearance.   HENT:      Right Ear: Tympanic membrane normal.      Left Ear: Tympanic membrane normal.      Nose: Congestion and rhinorrhea present.   Cardiovascular:      Rate and Rhythm: Normal rate and regular rhythm.      Pulses: Normal pulses.      Heart sounds: Normal heart sounds.   Pulmonary:      Effort: Pulmonary effort is normal.      Breath sounds: Normal breath sounds.   Lymphadenopathy:      Cervical: Cervical adenopathy present.   Neurological:      Mental Status: He is alert.

## (undated) DEVICE — DRAPE SURGIKIT SADDLE BAG

## (undated) DEVICE — DRAPE C-ARM X-RAY

## (undated) DEVICE — INTENDED FOR TISSUE SEPARATION, AND OTHER PROCEDURES THAT REQUIRE A SHARP SURGICAL BLADE TO PUNCTURE OR CUT.: Brand: BARD-PARKER SAFETY BLADES SIZE 10, STERILE

## (undated) DEVICE — GLOVE INDICATOR PI UNDERGLOVE SZ 7.5 BLUE

## (undated) DEVICE — ALCOHOL ISOPROPYL BLUE

## (undated) DEVICE — 2.5MM DRILL BIT QC 240MM 150MM CALIBRATION

## (undated) DEVICE — 3.5MM CORTEX SCREW SELF-TAPPING 80MM
Type: IMPLANTABLE DEVICE | Site: TIBIA | Status: NON-FUNCTIONAL
Removed: 2024-01-23

## (undated) DEVICE — DRAPE SHEET THREE QUARTER

## (undated) DEVICE — DRAPE SHEET X-LG

## (undated) DEVICE — CHLORAPREP HI-LITE 26ML ORANGE

## (undated) DEVICE — SUT MONOCRYL 2-0 SH 27 IN Y417H

## (undated) DEVICE — ELECTRODE BLADE MOD E-Z CLEAN  2.75IN 7CM -0012AM

## (undated) DEVICE — INTENDED FOR TISSUE SEPARATION, AND OTHER PROCEDURES THAT REQUIRE A SHARP SURGICAL BLADE TO PUNCTURE OR CUT.: Brand: BARD-PARKER SAFETY BLADES SIZE 15, STERILE

## (undated) DEVICE — DISPOSABLE EQUIPMENT COVER: Brand: SMALL TOWEL DRAPE

## (undated) DEVICE — HEAVY DUTY TABLE COVER: Brand: CONVERTORS

## (undated) DEVICE — 2.8MM DRILL BIT QC 200MM 110MM CALIBRATION

## (undated) DEVICE — ACE WRAP 6 IN UNSTERILE

## (undated) DEVICE — BONE WAX WHITE: Brand: BONE WAX WHITE

## (undated) DEVICE — SUT PDS II 0 CT-1 27 IN Z340H

## (undated) DEVICE — DISPOSABLE OR TOWEL: Brand: CARDINAL HEALTH

## (undated) DEVICE — DRAPE C-ARMOUR

## (undated) DEVICE — BETHLEHEM UNIVERSAL  MIONR EXT: Brand: CARDINAL HEALTH

## (undated) DEVICE — PROXIMATE SKIN STAPLERS (35 WIDE) CONTAINS 35 STAINLESS STEEL STAPLES (FIXED HEAD): Brand: PROXIMATE

## (undated) DEVICE — GLOVE SRG BIOGEL 7.5

## (undated) DEVICE — SPONGE SCRUB 4 PCT CHLORHEXIDINE

## (undated) DEVICE — GLOVE SRG BIOGEL 8

## (undated) DEVICE — ACE WRAP 4 IN UNSTERILE

## (undated) DEVICE — PAD CAST 4 IN COTTON NON STERILE

## (undated) DEVICE — PADDING CAST 4 IN  COTTON STRL

## (undated) DEVICE — 2.5MM DRILL BIT QC 135MM 45MM CALIBRATION

## (undated) DEVICE — CURITY NON-ADHERENT STRIPS: Brand: CURITY

## (undated) DEVICE — GAUZE SPONGES,16 PLY: Brand: CURITY

## (undated) DEVICE — BANDAGE, ESMARK LF STR 6"X9' (20/CS): Brand: CYPRESS

## (undated) DEVICE — SUT ETHILON 2-0 PS 18 IN 585H

## (undated) DEVICE — PENCIL ELECTROSURG E-Z CLEAN -0035H

## (undated) DEVICE — GLOVE INDICATOR PI UNDERGLOVE SZ 8 BLUE

## (undated) DEVICE — MEDI-VAC YANKAUER SUCTION HANDLE W/STRAIGHT TIP & CONTROL VENT: Brand: CARDINAL HEALTH